# Patient Record
Sex: FEMALE | Race: WHITE | NOT HISPANIC OR LATINO
[De-identification: names, ages, dates, MRNs, and addresses within clinical notes are randomized per-mention and may not be internally consistent; named-entity substitution may affect disease eponyms.]

---

## 2017-10-06 ENCOUNTER — RX RENEWAL (OUTPATIENT)
Age: 72
End: 2017-10-06

## 2017-11-11 DIAGNOSIS — Z92.89 PERSONAL HISTORY OF OTHER MEDICAL TREATMENT: ICD-10-CM

## 2017-11-11 DIAGNOSIS — M15.9 POLYOSTEOARTHRITIS, UNSPECIFIED: ICD-10-CM

## 2017-11-11 DIAGNOSIS — F15.90 OTHER STIMULANT USE, UNSPECIFIED, UNCOMPLICATED: ICD-10-CM

## 2017-11-11 DIAGNOSIS — Z82.49 FAMILY HISTORY OF ISCHEMIC HEART DISEASE AND OTHER DISEASES OF THE CIRCULATORY SYSTEM: ICD-10-CM

## 2017-11-11 DIAGNOSIS — K58.9 IRRITABLE BOWEL SYNDROME W/OUT DIARRHEA: ICD-10-CM

## 2017-11-13 ENCOUNTER — APPOINTMENT (OUTPATIENT)
Dept: FAMILY MEDICINE | Facility: CLINIC | Age: 72
End: 2017-11-13
Payer: MEDICARE

## 2017-11-13 VITALS
SYSTOLIC BLOOD PRESSURE: 138 MMHG | HEIGHT: 65 IN | HEART RATE: 70 BPM | OXYGEN SATURATION: 98 % | RESPIRATION RATE: 16 BRPM | DIASTOLIC BLOOD PRESSURE: 78 MMHG | BODY MASS INDEX: 21.49 KG/M2 | WEIGHT: 129 LBS

## 2017-11-13 PROCEDURE — 90662 IIV NO PRSV INCREASED AG IM: CPT

## 2017-11-13 PROCEDURE — G0008: CPT

## 2017-11-13 PROCEDURE — 99213 OFFICE O/P EST LOW 20 MIN: CPT | Mod: 25

## 2017-11-13 RX ORDER — ASCORBIC ACID/BIOFLAVONOIDS 500-200 MG
TABLET ORAL
Refills: 0 | Status: ACTIVE | COMMUNITY

## 2017-11-13 RX ORDER — PSYLLIUM HUSK 0.4 G
CAPSULE ORAL
Refills: 0 | Status: ACTIVE | COMMUNITY

## 2018-01-17 ENCOUNTER — RX RENEWAL (OUTPATIENT)
Age: 73
End: 2018-01-17

## 2018-02-26 ENCOUNTER — RX RENEWAL (OUTPATIENT)
Age: 73
End: 2018-02-26

## 2018-04-18 ENCOUNTER — TRANSCRIPTION ENCOUNTER (OUTPATIENT)
Age: 73
End: 2018-04-18

## 2018-04-18 ENCOUNTER — APPOINTMENT (OUTPATIENT)
Dept: FAMILY MEDICINE | Facility: CLINIC | Age: 73
End: 2018-04-18
Payer: MEDICARE

## 2018-04-18 VITALS
SYSTOLIC BLOOD PRESSURE: 132 MMHG | WEIGHT: 137 LBS | HEART RATE: 73 BPM | HEIGHT: 64 IN | OXYGEN SATURATION: 96 % | BODY MASS INDEX: 23.39 KG/M2 | DIASTOLIC BLOOD PRESSURE: 70 MMHG

## 2018-04-18 DIAGNOSIS — K21.9 GASTRO-ESOPHAGEAL REFLUX DISEASE W/OUT ESOPHAGITIS: ICD-10-CM

## 2018-04-18 PROCEDURE — 99213 OFFICE O/P EST LOW 20 MIN: CPT

## 2018-04-18 RX ORDER — HYDROCODONE BITARTRATE AND ACETAMINOPHEN 5; 325 MG/1; MG/1
5-325 TABLET ORAL
Refills: 0 | Status: DISCONTINUED | COMMUNITY
End: 2018-04-18

## 2018-04-18 RX ORDER — BIFIDOBACTERIUM LONGUM 10MM CELL
CAPSULE ORAL
Refills: 0 | Status: DISCONTINUED | COMMUNITY
End: 2018-04-18

## 2018-04-18 RX ORDER — CARISOPRODOL 350 MG/1
350 TABLET ORAL
Refills: 0 | Status: DISCONTINUED | COMMUNITY
End: 2018-04-18

## 2018-04-18 RX ORDER — IRON/IRON ASP GLY/FA/MV-MIN 38 125-25-1MG
TABLET ORAL EVERY OTHER DAY
Refills: 0 | Status: ACTIVE | COMMUNITY

## 2018-05-16 ENCOUNTER — CLINICAL ADVICE (OUTPATIENT)
Age: 73
End: 2018-05-16

## 2018-05-16 DIAGNOSIS — L65.9 NONSCARRING HAIR LOSS, UNSPECIFIED: ICD-10-CM

## 2018-07-12 ENCOUNTER — APPOINTMENT (OUTPATIENT)
Dept: FAMILY MEDICINE | Facility: CLINIC | Age: 73
End: 2018-07-12
Payer: MEDICARE

## 2018-07-12 VITALS
BODY MASS INDEX: 23.52 KG/M2 | WEIGHT: 137 LBS | RESPIRATION RATE: 14 BRPM | HEART RATE: 74 BPM | SYSTOLIC BLOOD PRESSURE: 124 MMHG | DIASTOLIC BLOOD PRESSURE: 80 MMHG | OXYGEN SATURATION: 98 %

## 2018-07-12 DIAGNOSIS — Z87.898 PERSONAL HISTORY OF OTHER SPECIFIED CONDITIONS: ICD-10-CM

## 2018-07-12 DIAGNOSIS — D51.3 OTHER DIETARY VITAMIN B12 DEFICIENCY ANEMIA: ICD-10-CM

## 2018-07-12 PROCEDURE — 99214 OFFICE O/P EST MOD 30 MIN: CPT

## 2018-07-12 RX ORDER — METOPROLOL SUCCINATE 25 MG/1
25 TABLET, EXTENDED RELEASE ORAL
Qty: 90 | Refills: 0 | Status: ACTIVE | COMMUNITY
Start: 2017-05-17

## 2018-07-12 RX ORDER — VITAMIN B COMPLEX/FOLIC ACID 0.4 MG
TABLET ORAL
Refills: 0 | Status: ACTIVE | COMMUNITY

## 2018-07-12 NOTE — PHYSICAL EXAM
[No Acute Distress] : no acute distress [Well Nourished] : well nourished [Well Developed] : well developed [Well-Appearing] : well-appearing [Normal Voice/Communication] : normal voice/communication [No Respiratory Distress] : no respiratory distress  [Clear to Auscultation] : lungs were clear to auscultation bilaterally [No Accessory Muscle Use] : no accessory muscle use [Normal Rate] : normal rate  [Regular Rhythm] : with a regular rhythm [Normal S1, S2] : normal S1 and S2 [No Murmur] : no murmur heard [No Edema] : there was no peripheral edema [Speech Grossly Normal] : speech grossly normal [Memory Grossly Normal] : memory grossly normal [Normal Affect] : the affect was normal [Normal Mood] : the mood was normal [Normal Insight/Judgement] : insight and judgment were intact [de-identified] : no tenderness on palpation of lumbar area, decreased ROM noted [de-identified] : gait is slow and stiff

## 2018-07-12 NOTE — HISTORY OF PRESENT ILLNESS
[FreeTextEntry1] : pt presents for med check and b/w rx  [de-identified] : Her back pain is the same.  She is unable to take any NSAIDs due to gastritis.  She already tried tylenol without improvement.  She now has to do her own landscaping.  When she doesn't take the hydrocodone her pain could be at a 9 and with her medication is can be a zero.  The hydrocodone allows her to stay active.  She takes the medication on average 5-7 days per week.  She hasn't been needing the carisoprodol as the muscle cramps resolved.  She denies any side effects from the medication.  She takes the ranitidine daily but still gets heartburn.  Her GI doctor just ordered nexium for her to take.

## 2018-07-12 NOTE — ASSESSMENT
[FreeTextEntry1] : she signed the controlled substances agreement, the opioid risk assessment tool was performed, she submitted a urine for toxicology, we watched the [facility] opioid video together, I renewed her hydrocodone and checked I-STOP, ref #[id number], she will continue to only take it when needed, labs ordered, she will follow up in 3 months or sooner prn, her hypertension and hyperlipidemia are stable

## 2018-07-12 NOTE — REVIEW OF SYSTEMS
[Abdominal Pain] : abdominal pain [Heartburn] : heartburn [Joint Pain] : joint pain [Joint Stiffness] : joint stiffness [Muscle Pain] : muscle pain [Back Pain] : back pain [Negative] : Heme/Lymph

## 2018-08-10 ENCOUNTER — RESULT REVIEW (OUTPATIENT)
Age: 73
End: 2018-08-10

## 2018-11-28 ENCOUNTER — APPOINTMENT (OUTPATIENT)
Dept: FAMILY MEDICINE | Facility: CLINIC | Age: 73
End: 2018-11-28
Payer: MEDICARE

## 2018-11-28 VITALS
SYSTOLIC BLOOD PRESSURE: 130 MMHG | TEMPERATURE: 98.7 F | RESPIRATION RATE: 17 BRPM | OXYGEN SATURATION: 98 % | HEART RATE: 73 BPM | DIASTOLIC BLOOD PRESSURE: 80 MMHG

## 2018-11-28 VITALS — DIASTOLIC BLOOD PRESSURE: 78 MMHG | SYSTOLIC BLOOD PRESSURE: 130 MMHG

## 2018-11-28 DIAGNOSIS — R68.89 OTHER GENERAL SYMPTOMS AND SIGNS: ICD-10-CM

## 2018-11-28 DIAGNOSIS — R63.5 ABNORMAL WEIGHT GAIN: ICD-10-CM

## 2018-11-28 PROCEDURE — 99213 OFFICE O/P EST LOW 20 MIN: CPT | Mod: 25

## 2018-11-28 PROCEDURE — G0008: CPT

## 2018-11-28 PROCEDURE — 90662 IIV NO PRSV INCREASED AG IM: CPT

## 2018-11-28 NOTE — ASSESSMENT
[FreeTextEntry1] : high-dose flu vaccine was given, labs were reviewed, no medication changes were made, I renewed hydrocodone and checked I-STOP, ref #[id number], she will follow up in 4 months or sooner if needed, she was given emotional support

## 2018-11-28 NOTE — PHYSICAL EXAM
[No Acute Distress] : no acute distress [Well Nourished] : well nourished [Well Developed] : well developed [Well-Appearing] : well-appearing [Normal Voice/Communication] : normal voice/communication [No Respiratory Distress] : no respiratory distress  [Clear to Auscultation] : lungs were clear to auscultation bilaterally [No Accessory Muscle Use] : no accessory muscle use [Normal Rate] : normal rate  [Regular Rhythm] : with a regular rhythm [Normal S1, S2] : normal S1 and S2 [No Carotid Bruits] : no carotid bruits [No Edema] : there was no peripheral edema [Speech Grossly Normal] : speech grossly normal [Memory Grossly Normal] : memory grossly normal [Normal Affect] : the affect was normal [Normal Mood] : the mood was normal [Normal Insight/Judgement] : insight and judgment were intact [de-identified] : decreased ROM of lumbar spine [de-identified] : gait is slow and stiff

## 2018-11-28 NOTE — HISTORY OF PRESENT ILLNESS
[FreeTextEntry1] : Patient present for med check, flu shot \par  [de-identified] : She is sad since she had to put her 18 year old dog to sleep.  She has been sitting more recently and it is causing more back discomfort.  The hydrocodone is helping to alleviate the pain.  She only takes it when really necessary and denies any side effects.  It brings her pain level from severe to tolerable.  She has lost about 7 pounds and attributes it to being more physically active earlier in the fall raking leaves.

## 2018-11-28 NOTE — REVIEW OF SYSTEMS
[Recent Change In Weight] : ~T recent weight change [Joint Pain] : joint pain [Joint Stiffness] : joint stiffness [Muscle Pain] : muscle pain [Back Pain] : back pain [Negative] : Heme/Lymph

## 2018-11-30 DIAGNOSIS — Z92.29 PERSONAL HISTORY OF OTHER DRUG THERAPY: ICD-10-CM

## 2019-03-26 ENCOUNTER — RX RENEWAL (OUTPATIENT)
Age: 74
End: 2019-03-26

## 2019-03-26 ENCOUNTER — TRANSCRIPTION ENCOUNTER (OUTPATIENT)
Age: 74
End: 2019-03-26

## 2019-04-29 ENCOUNTER — APPOINTMENT (OUTPATIENT)
Dept: FAMILY MEDICINE | Facility: CLINIC | Age: 74
End: 2019-04-29
Payer: MEDICARE

## 2019-04-29 VITALS
OXYGEN SATURATION: 96 % | WEIGHT: 129 LBS | BODY MASS INDEX: 22.14 KG/M2 | RESPIRATION RATE: 12 BRPM | DIASTOLIC BLOOD PRESSURE: 70 MMHG | SYSTOLIC BLOOD PRESSURE: 120 MMHG | HEART RATE: 64 BPM

## 2019-04-29 PROCEDURE — 99213 OFFICE O/P EST LOW 20 MIN: CPT

## 2019-04-29 RX ORDER — CARISOPRODOL 350 MG/1
350 TABLET ORAL
Qty: 90 | Refills: 0 | Status: DISCONTINUED | COMMUNITY
Start: 2017-11-13 | End: 2019-04-29

## 2019-04-29 RX ORDER — HYDROCODONE BITARTRATE AND ACETAMINOPHEN 5; 325 MG/1; MG/1
5-325 TABLET ORAL
Qty: 180 | Refills: 0 | Status: DISCONTINUED | COMMUNITY
Start: 2017-11-13 | End: 2019-04-29

## 2019-04-29 RX ORDER — CHLORDIAZEPOXIDE HYDROCHLORIDE AND CLIDINIUM BROMIDE 5; 2.5 MG/1; MG/1
5-2.5 CAPSULE ORAL
Qty: 120 | Refills: 0 | Status: DISCONTINUED | COMMUNITY
Start: 2018-07-03 | End: 2019-04-29

## 2019-04-29 RX ORDER — RANITIDINE 150 MG/1
150 TABLET ORAL
Qty: 180 | Refills: 3 | Status: DISCONTINUED | COMMUNITY
Start: 2018-04-18 | End: 2019-04-29

## 2019-04-29 RX ORDER — ESOMEPRAZOLE MAGNESIUM 40 MG/1
40 CAPSULE, DELAYED RELEASE ORAL
Qty: 90 | Refills: 0 | Status: DISCONTINUED | COMMUNITY
Start: 2018-07-03 | End: 2019-04-29

## 2019-04-29 NOTE — HISTORY OF PRESENT ILLNESS
[FreeTextEntry1] : pt presents for 6 month check  [de-identified] : She has had pain in the left knee for the past month that wakes her from sleep.  She denies any injury but she has been walking more as she has a new puppy.  The pain in her back is temporarily better after walking but she gets very stiff and sore.  She takes the hydrocodone when the pain level gets to an 8 out of 10 and it brings it down to a 0-2.  She is tolerating the medication well and denies any side effects.

## 2019-04-29 NOTE — REVIEW OF SYSTEMS
[Heartburn] : heartburn [Back Pain] : back pain [Negative] : Heme/Lymph [FreeTextEntry7] : occasional heartburn [FreeTextEntry9] : left knee pain

## 2019-04-29 NOTE — PLAN
[FreeTextEntry1] : I ordered an xray of the left knee and labs, I renewed hydrocodone and checked I-STOP, ref #[id number], she will continue to take it only for severe pain and will follow up for a well visit in the fall or sooner prn

## 2019-04-29 NOTE — PHYSICAL EXAM
[No Acute Distress] : no acute distress [Well Nourished] : well nourished [Well Developed] : well developed [Well-Appearing] : well-appearing [Normal Voice/Communication] : normal voice/communication [Speech Grossly Normal] : speech grossly normal [Memory Grossly Normal] : memory grossly normal [Normal Affect] : the affect was normal [Normal Mood] : the mood was normal [Normal Insight/Judgement] : insight and judgment were intact [de-identified] : no tenderness on palpation of lumbar area, decreased flexion and extension [de-identified] : left knee without swelling, slight crepitus with flexion and extension

## 2019-05-02 ENCOUNTER — APPOINTMENT (OUTPATIENT)
Dept: RADIOLOGY | Facility: CLINIC | Age: 74
End: 2019-05-02
Payer: MEDICARE

## 2019-05-02 PROCEDURE — 73562 X-RAY EXAM OF KNEE 3: CPT | Mod: LT

## 2019-05-06 ENCOUNTER — RX RENEWAL (OUTPATIENT)
Age: 74
End: 2019-05-06

## 2019-08-13 ENCOUNTER — RESULT REVIEW (OUTPATIENT)
Age: 74
End: 2019-08-13

## 2019-10-21 ENCOUNTER — APPOINTMENT (OUTPATIENT)
Dept: FAMILY MEDICINE | Facility: CLINIC | Age: 74
End: 2019-10-21
Payer: MEDICARE

## 2019-10-21 VITALS
OXYGEN SATURATION: 97 % | SYSTOLIC BLOOD PRESSURE: 134 MMHG | HEART RATE: 74 BPM | RESPIRATION RATE: 16 BRPM | DIASTOLIC BLOOD PRESSURE: 76 MMHG

## 2019-10-21 DIAGNOSIS — M25.562 PAIN IN LEFT KNEE: ICD-10-CM

## 2019-10-21 DIAGNOSIS — D50.8 OTHER IRON DEFICIENCY ANEMIAS: ICD-10-CM

## 2019-10-21 DIAGNOSIS — M19.90 UNSPECIFIED OSTEOARTHRITIS, UNSPECIFIED SITE: ICD-10-CM

## 2019-10-21 PROCEDURE — 99213 OFFICE O/P EST LOW 20 MIN: CPT | Mod: 25

## 2019-10-21 PROCEDURE — 90653 IIV ADJUVANT VACCINE IM: CPT

## 2019-10-21 PROCEDURE — G0008: CPT

## 2019-10-21 RX ORDER — DICLOFENAC SODIUM 10 MG/G
1 GEL TOPICAL
Qty: 300 | Refills: 3 | Status: COMPLETED | COMMUNITY
Start: 2017-10-06 | End: 2019-10-21

## 2019-11-11 ENCOUNTER — RX RENEWAL (OUTPATIENT)
Age: 74
End: 2019-11-11

## 2019-11-11 ENCOUNTER — MEDICATION RENEWAL (OUTPATIENT)
Age: 74
End: 2019-11-11

## 2019-11-29 ENCOUNTER — RESULT REVIEW (OUTPATIENT)
Age: 74
End: 2019-11-29

## 2020-02-04 ENCOUNTER — APPOINTMENT (OUTPATIENT)
Dept: FAMILY MEDICINE | Facility: CLINIC | Age: 75
End: 2020-02-04
Payer: MEDICARE

## 2020-02-04 VITALS
OXYGEN SATURATION: 97 % | SYSTOLIC BLOOD PRESSURE: 120 MMHG | HEIGHT: 64 IN | WEIGHT: 128 LBS | DIASTOLIC BLOOD PRESSURE: 70 MMHG | RESPIRATION RATE: 12 BRPM | BODY MASS INDEX: 21.85 KG/M2 | TEMPERATURE: 99.4 F | HEART RATE: 76 BPM

## 2020-02-04 PROCEDURE — 99213 OFFICE O/P EST LOW 20 MIN: CPT

## 2020-02-04 NOTE — REVIEW OF SYSTEMS
[Fever] : no fever [Chills] : chills [Fatigue] : fatigue [Discharge] : no discharge [Earache] : earache [Nasal Discharge] : nasal discharge [Sore Throat] : no sore throat [Postnasal Drip] : no postnasal drip [Shortness Of Breath] : no shortness of breath [Wheezing] : no wheezing [Cough] : no cough [Headache] : headache [Negative] : Heme/Lymph

## 2020-02-04 NOTE — PHYSICAL EXAM
[No Acute Distress] : no acute distress [Well Nourished] : well nourished [Well-Appearing] : well-appearing [Well Developed] : well developed [Normal Sclera/Conjunctiva] : normal sclera/conjunctiva [Normal Voice/Communication] : normal voice/communication [Normal Oropharynx] : the oropharynx was normal [Normal Outer Ear/Nose] : the outer ears and nose were normal in appearance [Normal TMs] : both tympanic membranes were normal [No Lymphadenopathy] : no lymphadenopathy [No Respiratory Distress] : no respiratory distress  [Supple] : supple [Normal Rate] : normal rate  [No Accessory Muscle Use] : no accessory muscle use [Clear to Auscultation] : lungs were clear to auscultation bilaterally [Regular Rhythm] : with a regular rhythm [Normal S1, S2] : normal S1 and S2 [Coordination Grossly Intact] : coordination grossly intact [Speech Grossly Normal] : speech grossly normal [Memory Grossly Normal] : memory grossly normal [Normal Mood] : the mood was normal [Normal Affect] : the affect was normal [de-identified] : mils swelling in nasal passages [Normal Insight/Judgement] : insight and judgment were intact

## 2020-02-04 NOTE — HISTORY OF PRESENT ILLNESS
[FreeTextEntry8] : pt c/o ears cracking +rn/pnd -cough -st x 3 week \par pt c/o rib area discomfort x on and off 1 week.  She started with a runny nose about 3 weeks ago.  She is using zicam nasal spray.  She has had some mucous dripping down her throat. She has had a headache and feels fatigued and she has pain in her right ear.  She had discomfort on the sides where her ribs are but that has improved.  She isn't coughing.  She has a bunion on her foot and hasn't been walking.  She is taking tylenol for the headaches.

## 2020-02-04 NOTE — PLAN
[FreeTextEntry1] : she was advised to stop the zicam nasal spray and she will use the fluticasone nasal spray and will follow up for any worsening sinus pain or pressure or change in color in mucous

## 2020-04-20 ENCOUNTER — APPOINTMENT (OUTPATIENT)
Dept: FAMILY MEDICINE | Facility: CLINIC | Age: 75
End: 2020-04-20
Payer: MEDICARE

## 2020-04-20 DIAGNOSIS — J06.9 ACUTE UPPER RESPIRATORY INFECTION, UNSPECIFIED: ICD-10-CM

## 2020-04-20 PROCEDURE — 99213 OFFICE O/P EST LOW 20 MIN: CPT | Mod: 95

## 2020-04-20 NOTE — PLAN
[FreeTextEntry1] : she will be mailed a lab rx, she declined having the Hepatitis C bloodwork done, she will call for a medication renewal when needed and follow up in 3 months or sooner prn

## 2020-04-20 NOTE — REVIEW OF SYSTEMS
[Abdominal Pain] : no abdominal pain [Constipation] : constipation [Nausea] : no nausea [Vomiting] : no vomiting [Joint Stiffness] : joint stiffness [Joint Pain] : joint pain [Muscle Pain] : muscle pain [Back Pain] : back pain [Negative] : Heme/Lymph

## 2020-04-20 NOTE — HISTORY OF PRESENT ILLNESS
[Persistent pain despite utilization of non-opioid alternative(s)] : Persistent pain despite utilization of non-opioid alternative(s) [< = 3 Low Risk] : Opioid Risk Tool: < = 3 Low Risk [Opioids for pain that has lasted more than 3 months, or past time of normal tissue healing (> 3 months)] : Opioids for pain that has lasted more than 3 months, or past time of normal tissue healing (> 3 months) [Medical Office: ([facility])___] : at the medical office located in  [Home] : at home, [unfilled] , at the time of the visit. [Self] : self [Patient] : the patient [9] : 1. What number best describes your pain on average in the past week? 9/10 pain [8] : 2. What number best describes how, during the past week, pain has interfered with your enjoyment of life? 8/10 pain [7] : 3. What number best describes how, during the past week, pain has interfered with your general activity? 7/10 pain [I-Stop completed at today's visit] : I-Stop completed at today's visit [] : Sedation: None [<50 MME/day (low risk)] : <50 MME/day (low risk) [A discussion was had and/or printed educational materials were provided to the patient inclusive of safe treatment and optimization.] : A discussion was had and/or printed educational materials were provided to the patient inclusive of safe treatment and optimization. The treatment may include non-pharmacologic modalities or a combination of approaches.  We discussed risks, benefits, and alternatives associated with treatment [Last controlled substance contract reviewed and signed with patient:] : Last controlled substance contract reviewed and signed with patient [FreeTextEntry3] : 10 [FreeTextEntry2] : 24 [de-identified] : 4/20/2020 [FreeTextEntry6] : she will be maintained on the low dose of hydrocodone, she is receiving relief and when the weather improves and the COVID 19 pandemic resolves and she is more active she will try to reduce the dose [FreeTextEntry4] : 7/12/18 [de-identified] : After receiving verbal consent the visit was performed virtually via [employer] as the patient was unable to be seen in the office due to the COVID 19 pandemic.  She is taking the hydrocodone and getting relief of the pain.  The pain has worsened due to the weather and she has been sitting more as she is staying home due to the pandemic.\par  [FreeTextEntry1] : Pt presents for a follow up on her medication.

## 2020-04-20 NOTE — PHYSICAL EXAM
[Well Nourished] : well nourished [Well Developed] : well developed [No Acute Distress] : no acute distress [Normal Voice/Communication] : normal voice/communication [Normal Sclera/Conjunctiva] : normal sclera/conjunctiva [Well-Appearing] : well-appearing [Speech Grossly Normal] : speech grossly normal [Memory Grossly Normal] : memory grossly normal [Normal Affect] : the affect was normal [Coordination Grossly Intact] : coordination grossly intact [Normal Insight/Judgement] : insight and judgment were intact [Normal Mood] : the mood was normal [de-identified] : decreased flexion and extension of lumbar spine

## 2020-06-10 ENCOUNTER — APPOINTMENT (OUTPATIENT)
Dept: FAMILY MEDICINE | Facility: CLINIC | Age: 75
End: 2020-06-10
Payer: MEDICARE

## 2020-06-10 DIAGNOSIS — Z12.39 ENCOUNTER FOR OTHER SCREENING FOR MALIGNANT NEOPLASM OF BREAST: ICD-10-CM

## 2020-06-10 PROCEDURE — 99213 OFFICE O/P EST LOW 20 MIN: CPT | Mod: 95

## 2020-06-10 NOTE — REVIEW OF SYSTEMS
[Joint Pain] : joint pain [Joint Stiffness] : joint stiffness [Back Pain] : back pain [Negative] : Heme/Lymph [Constipation] : no constipation

## 2020-06-10 NOTE — PHYSICAL EXAM
[No Acute Distress] : no acute distress [Well Nourished] : well nourished [Well Developed] : well developed [Well-Appearing] : well-appearing [Normal Voice/Communication] : normal voice/communication [No Respiratory Distress] : no respiratory distress  [No Accessory Muscle Use] : no accessory muscle use [Speech Grossly Normal] : speech grossly normal [Memory Grossly Normal] : memory grossly normal [Normal Affect] : the affect was normal [Normal Mood] : the mood was normal [Normal Insight/Judgement] : insight and judgment were intact

## 2020-06-10 NOTE — HISTORY OF PRESENT ILLNESS
[Persistent pain despite utilization of non-opioid alternative(s)] : Persistent pain despite utilization of non-opioid alternative(s) [Opioids for pain that has lasted more than 3 months, or past time of normal tissue healing (> 3 months)] : Opioids for pain that has lasted more than 3 months, or past time of normal tissue healing (> 3 months) [7] : 2. What number best describes how, during the past week, pain has interfered with your enjoyment of life? 7/10 pain [10 (completely interferes)] : 3. What number best describes how, during the past week, pain has interfered with your general activity? 10/10 pain [< = 3 Low Risk] : Opioid Risk Tool: < = 3 Low Risk [] : Sedation: None [<50 MME/day (low risk)] : <50 MME/day (low risk) [A discussion was had and/or printed educational materials were provided to the patient inclusive of safe treatment and optimization.] : A discussion was had and/or printed educational materials were provided to the patient inclusive of safe treatment and optimization. The treatment may include non-pharmacologic modalities or a combination of approaches.  We discussed risks, benefits, and alternatives associated with treatment [Home] : at home, [unfilled] , at the time of the visit. [Medical Office: ([facility])___] : at the medical office located in  [Verbal consent obtained from patient] : the patient, [unfilled] [FreeTextEntry2] : 24 [FreeTextEntry3] : 10 [de-identified] : 6/9/2020 [FreeTextEntry4] : 7/12/2018 [FreeTextEntry6] : She will continue low dose of hydrocodone for her ongoing back pain, she declines seeing pain management for injections and isn't a candidate for NSAIDs due to hypertension [FreeTextEntry1] : Pt presents for a follow up for her ongoing back pain [de-identified] : After receiving verbal consent the visit was performed virtually via [employer] as the patient was unable to be seen in the office due to the COVID 19 pandemic.  She has been able to be more active as the weather improved but is still in pain on a daily basis with her back.  She takes up to 2 hydrocodone per day but not every day.  She doesn't want to see a pain management provider for injections.\par

## 2020-06-10 NOTE — PLAN
[FreeTextEntry1] : after checking I-STOP I renewed the hydrocodone, she will continue to take it only for severe pain, she will be mailed a mammogram rx and will go for lab testing as previously ordered, she was advised to follow up in the fall for a wellness visit and flu vaccine

## 2020-08-26 ENCOUNTER — RX RENEWAL (OUTPATIENT)
Age: 75
End: 2020-08-26

## 2020-10-12 ENCOUNTER — APPOINTMENT (OUTPATIENT)
Dept: FAMILY MEDICINE | Facility: CLINIC | Age: 75
End: 2020-10-12
Payer: MEDICARE

## 2020-10-12 VITALS
RESPIRATION RATE: 14 BRPM | HEIGHT: 64 IN | SYSTOLIC BLOOD PRESSURE: 130 MMHG | WEIGHT: 133 LBS | TEMPERATURE: 98 F | BODY MASS INDEX: 22.71 KG/M2 | OXYGEN SATURATION: 98 % | HEART RATE: 78 BPM | DIASTOLIC BLOOD PRESSURE: 70 MMHG

## 2020-10-12 PROCEDURE — G0438: CPT

## 2020-10-12 PROCEDURE — G0008: CPT

## 2020-10-12 PROCEDURE — 90662 IIV NO PRSV INCREASED AG IM: CPT

## 2020-10-12 PROCEDURE — 99212 OFFICE O/P EST SF 10 MIN: CPT | Mod: 25

## 2020-10-12 RX ORDER — EFINACONAZOLE 100 MG/ML
10 SOLUTION TOPICAL
Qty: 8 | Refills: 0 | Status: ACTIVE | COMMUNITY
Start: 2020-08-06

## 2020-10-12 RX ORDER — PENICILLIN V POTASSIUM 500 MG/1
500 TABLET, FILM COATED ORAL
Qty: 40 | Refills: 0 | Status: DISCONTINUED | COMMUNITY
Start: 2020-07-21 | End: 2020-10-12

## 2020-10-12 NOTE — PHYSICAL EXAM
[No Acute Distress] : no acute distress [Well Nourished] : well nourished [Well Developed] : well developed [Well-Appearing] : well-appearing [Normal Voice/Communication] : normal voice/communication [Normal Sclera/Conjunctiva] : normal sclera/conjunctiva [Supple] : supple [No Respiratory Distress] : no respiratory distress  [No Accessory Muscle Use] : no accessory muscle use [Clear to Auscultation] : lungs were clear to auscultation bilaterally [Normal Rate] : normal rate  [Regular Rhythm] : with a regular rhythm [Normal S1, S2] : normal S1 and S2 [No Carotid Bruits] : no carotid bruits [No Edema] : there was no peripheral edema [Soft] : abdomen soft [Non Tender] : non-tender [Non-distended] : non-distended [No HSM] : no HSM [Normal Bowel Sounds] : normal bowel sounds [Coordination Grossly Intact] : coordination grossly intact [No Focal Deficits] : no focal deficits [Speech Grossly Normal] : speech grossly normal [Memory Grossly Normal] : memory grossly normal [Normal Affect] : the affect was normal [Normal Mood] : the mood was normal [Normal Insight/Judgement] : insight and judgment were intact [de-identified] : TM's dull, some wax in canals, no occlusion [de-identified] : decreased flexion and extension of lumbar spine

## 2020-10-12 NOTE — PLAN
[FreeTextEntry1] : high dose flu vaccine was given, I-STOP was checked, hydrocodone was renewed, she will continue to take it appropriately, she will follow up in 3 months and will check labs prior

## 2020-10-12 NOTE — HISTORY OF PRESENT ILLNESS
[Persistent pain despite utilization of non-opioid alternative(s)] : Persistent pain despite utilization of non-opioid alternative(s) [Opioids for pain that has lasted more than 3 months, or past time of normal tissue healing (> 3 months)] : Opioids for pain that has lasted more than 3 months, or past time of normal tissue healing (> 3 months) [7] : 1. What number best describes your pain on average in the past week? 7/10 pain [5] : 2. What number best describes how, during the past week, pain has interfered with your enjoyment of life? 5/10 pain [3] : 3. What number best describes how, during the past week, pain has interfered with your general activity? 3/10 pain [< = 3 Low Risk] : Opioid Risk Tool: < = 3 Low Risk [<50 MME/day (low risk)] : <50 MME/day (low risk) [I-Stop completed at today's visit] : I-Stop completed at today's visit [Last controlled substance contract reviewed and signed with patient:] : Last controlled substance contract reviewed and signed with patient [] : Sedation: None [A discussion was had and/or printed educational materials were provided to the patient inclusive of safe treatment and optimization.] : A discussion was had and/or printed educational materials were provided to the patient inclusive of safe treatment and optimization. The treatment may include non-pharmacologic modalities or a combination of approaches.  We discussed risks, benefits, and alternatives associated with treatment [FreeTextEntry2] : 15 [FreeTextEntry3] : 10 [de-identified] : 10/12/2020 [FreeTextEntry4] : 10/12/2020 [FreeTextEntry6] : she will continue the low dose of hydrocodone as needed [de-identified] : has a clogged feeling in her ears.  She wants to know what her blood type is

## 2020-10-12 NOTE — HEALTH RISK ASSESSMENT
[Very Good] : ~his/her~  mood as very good [Never (0 pts)] : Never (0 points) [No] : In the past 12 months have you used drugs other than those required for medical reasons? No [No falls in past year] : Patient reported no falls in the past year [HIV test declined] : HIV test declined [Hepatitis C test declined] : Hepatitis C test declined [None] : None [Retired] : retired [Single] : single [Feels Safe at Home] : Feels safe at home [Fully functional (bathing, dressing, toileting, transferring, walking, feeding)] : Fully functional (bathing, dressing, toileting, transferring, walking, feeding) [Fully functional (using the telephone, shopping, preparing meals, housekeeping, doing laundry, using] : Fully functional and needs no help or supervision to perform IADLs (using the telephone, shopping, preparing meals, housekeeping, doing laundry, using transportation, managing medications and managing finances) [Reports changes in hearing] : Reports changes in hearing [Reports changes in dental health] : Reports changes in dental health [Smoke Detector] : smoke detector [Carbon Monoxide Detector] : carbon monoxide detector [Safety elements used in home] : safety elements used in home [Seat Belt] :  uses seat belt [Sunscreen] : uses sunscreen [Patient/Caregiver not ready to engage] : Patient/Caregiver not ready to engage [FreeTextEntry1] : see HPI [] : No [de-identified] : none [de-identified] : Dr Muller (Cardio), Dr Mace (Podiatry), Dr Humphries (Ophth) [de-identified] : walks for exercise [de-identified] : healthy [Change in mental status noted] : No change in mental status noted [Language] : denies difficulty with language [Behavior] : denies difficulty with behavior [Learning/Retaining New Information] : denies difficulty learning/retaining new information [Handling Complex Tasks] : denies difficulty handling complex tasks [Reasoning] : denies difficulty with reasoning [Spatial Ability and Orientation] : denies difficulty with spatial ability and orientation [Reports changes in vision] : Reports no changes in vision [Reports normal functional visual acuity (ie: able to read med bottle)] : Reports poor functional visual acuity.  [Guns at Home] : no guns at home [Travel to Developing Areas] : does not  travel to developing areas [TB Exposure] : is not being exposed to tuberculosis [Caregiver Concerns] : does not have caregiver concerns [ColonoscopyDate] : 11/13 [de-identified] : has a roommate [de-identified] : clogged feeling in ears [de-identified] : had a tooth pulled [AdvancecareDate] : 10/20

## 2020-10-12 NOTE — REVIEW OF SYSTEMS
[Joint Pain] : joint pain [Back Pain] : back pain [Negative] : Heme/Lymph [FreeTextEntry4] : clogged ears

## 2020-12-09 ENCOUNTER — APPOINTMENT (OUTPATIENT)
Dept: FAMILY MEDICINE | Facility: CLINIC | Age: 75
End: 2020-12-09

## 2020-12-20 ENCOUNTER — RX RENEWAL (OUTPATIENT)
Age: 75
End: 2020-12-20

## 2021-01-13 ENCOUNTER — APPOINTMENT (OUTPATIENT)
Dept: FAMILY MEDICINE | Facility: CLINIC | Age: 76
End: 2021-01-13
Payer: MEDICARE

## 2021-01-13 PROCEDURE — 99441: CPT | Mod: 95

## 2021-01-14 NOTE — HISTORY OF PRESENT ILLNESS
[10 (completely interferes)] : 2. What number best describes how, during the past week, pain has interfered with your enjoyment of life? 10/10 pain [< = 3 Low Risk] : Opioid Risk Tool: < = 3 Low Risk [Persistent pain despite utilization of non-opioid alternative(s)] : Persistent pain despite utilization of non-opioid alternative(s) [Opioids for pain that has lasted more than 3 months, or past time of normal tissue healing (> 3 months)] : Opioids for pain that has lasted more than 3 months, or past time of normal tissue healing (> 3 months) [8] : 1. What number best describes your pain on average in the past week? 8/10 pain [<50 MME/day (low risk)] : <50 MME/day (low risk) [I-Stop completed at today's visit] : I-Stop completed at today's visit [Last controlled substance contract reviewed and signed with patient:] : Last controlled substance contract reviewed and signed with patient [] : Sedation: None [A discussion was had and/or printed educational materials were provided to the patient inclusive of safe treatment and optimization.] : A discussion was had and/or printed educational materials were provided to the patient inclusive of safe treatment and optimization. The treatment may include non-pharmacologic modalities or a combination of approaches.  We discussed risks, benefits, and alternatives associated with treatment [Home] : at home, [unfilled] , at the time of the visit. [Medical Office: ([facility])___] : at the medical office located in  [Verbal consent obtained from patient] : the patient, [unfilled] [FreeTextEntry2] : 26 [FreeTextEntry3] : 10 [de-identified] : 1/13/2021 [FreeTextEntry4] : 10/12/2020 [FreeTextEntry6] : she will continue the low dose of hydrocodone, she is getting good pain relief and not experiencing any side effects [FreeTextEntry1] : Pt presents for a medication renewal for her back pain [de-identified] : The patient telephoned and requested a call back to discuss their health.  The visit was performed over the telephone as the patient was unable to be seen in the office due to the COVID 19 pandemic.\par

## 2021-01-14 NOTE — PLAN
[FreeTextEntry1] : I-STOP was checked and hydrocodone renewed, she will continue to take it appropriately and will follow up in 3 months or sooner prn

## 2021-01-20 ENCOUNTER — TRANSCRIPTION ENCOUNTER (OUTPATIENT)
Age: 76
End: 2021-01-20

## 2021-04-13 ENCOUNTER — APPOINTMENT (OUTPATIENT)
Dept: FAMILY MEDICINE | Facility: CLINIC | Age: 76
End: 2021-04-13
Payer: MEDICARE

## 2021-04-13 PROCEDURE — 99212 OFFICE O/P EST SF 10 MIN: CPT | Mod: 95

## 2021-04-13 RX ORDER — CIPROFLOXACIN AND DEXAMETHASONE 3; 1 MG/ML; MG/ML
0.3-0.1 SUSPENSION/ DROPS AURICULAR (OTIC)
Qty: 7 | Refills: 0 | Status: DISCONTINUED | COMMUNITY
Start: 2020-11-27

## 2021-04-13 NOTE — HISTORY OF PRESENT ILLNESS
[9] : 2. What number best describes how, during the past week, pain has interfered with your enjoyment of life? 9/10 pain [8] : 3. What number best describes how, during the past week, pain has interfered with your general activity? 8/10 pain [< = 3 Low Risk] : Opioid Risk Tool: < = 3 Low Risk [Home] : at home, [unfilled] , at the time of the visit. [Medical Office: ([facility])___] : at the medical office located in  [Verbal consent obtained from patient] : the patient, [unfilled] [Persistent pain despite utilization of non-opioid alternative(s)] : Persistent pain despite utilization of non-opioid alternative(s) [Opioids for pain that has lasted more than 3 months, or past time of normal tissue healing (> 3 months)] : Opioids for pain that has lasted more than 3 months, or past time of normal tissue healing (> 3 months) [I-Stop completed at today's visit] : I-Stop completed at today's visit [Last controlled substance contract reviewed and signed with patient:] : Last controlled substance contract reviewed and signed with patient [] : Sedation: None [A discussion was had and/or printed educational materials were provided to the patient inclusive of safe treatment and optimization.] : A discussion was had and/or printed educational materials were provided to the patient inclusive of safe treatment and optimization. The treatment may include non-pharmacologic modalities or a combination of approaches.  We discussed risks, benefits, and alternatives associated with treatment [FreeTextEntry2] : 25 [de-identified] : 4/13/2021 [FreeTextEntry4] : 10/12/2020 [FreeTextEntry6] : she will continue low dose of hydrocodone daily, she already tried injections with pain management without improvement [FreeTextEntry1] : Pt presents for a follow up on her back pain [de-identified] : After receiving verbal consent the visit was performed virtually via [employer] [facility] as the patient was unable to be seen in the office due to the COVID 19 pandemic.  She received both doses of the covid vaccine.  She isn't ready to schedule the mammogram yet.  Her back pain is unchanged, she takes 1-2 hydrocodone daily and does get pain relief.\par

## 2021-04-13 NOTE — REVIEW OF SYSTEMS
[Joint Pain] : joint pain [Joint Stiffness] : joint stiffness [Back Pain] : back pain [Negative] : Heme/Lymph [Muscle Pain] : no muscle pain

## 2021-04-13 NOTE — PHYSICAL EXAM
[No Acute Distress] : no acute distress [Well Nourished] : well nourished [Well Developed] : well developed [Well-Appearing] : well-appearing [Normal Voice/Communication] : normal voice/communication [No Respiratory Distress] : no respiratory distress  [Normal Mood] : the mood was normal

## 2021-04-13 NOTE — PLAN
[FreeTextEntry1] : she will be mailed a rx for the comprehensive urine drug screen and will go for the lab testing as previously ordered, I-STOP was checked and hydrocodone renewed, she will continue to take it only for severe pain and will follow up in 3 months or sooner prn

## 2021-07-13 ENCOUNTER — APPOINTMENT (OUTPATIENT)
Dept: FAMILY MEDICINE | Facility: CLINIC | Age: 76
End: 2021-07-13
Payer: MEDICARE

## 2021-07-13 VITALS
DIASTOLIC BLOOD PRESSURE: 80 MMHG | TEMPERATURE: 97.8 F | OXYGEN SATURATION: 98 % | RESPIRATION RATE: 14 BRPM | HEIGHT: 64 IN | BODY MASS INDEX: 21.85 KG/M2 | WEIGHT: 128 LBS | SYSTOLIC BLOOD PRESSURE: 126 MMHG | HEART RATE: 72 BPM

## 2021-07-13 LAB
AMPHETAMINE: NORMAL
BARBITUATES: NORMAL
BENZODIAZ UR-MCNC: NORMAL
BUPRENORPHINE: NORMAL
CANNABINOIDS SAL QL SCN: NORMAL
COCAINE METAB.OTHER UR-MCNC: NORMAL
ECSTASY (MDMA): NORMAL
METHADONE: NORMAL
METHAMPHETAMINE: NORMAL
OPIATES 300 (OPI 300/MOP): NORMAL
OPIATES: NORMAL
OXYCODONE UR-MCNC: NORMAL
PCP UR-MCNC: NORMAL
PROPOXYPHENE: NORMAL
THC UR QL: NORMAL
TRICYCLICS FLD-MCNC: NORMAL

## 2021-07-13 PROCEDURE — 99214 OFFICE O/P EST MOD 30 MIN: CPT

## 2021-07-13 PROCEDURE — 80305 DRUG TEST PRSMV DIR OPT OBS: CPT | Mod: QW

## 2021-07-13 NOTE — PLAN
[FreeTextEntry1] : the lab report including CBC, CMP, lipids was reviewed, she will continue healthy diet and medications, POCT urine drug screen was ordered and will be reviewed, I-STOP was checked and hydrocodone renewed, she isn't ready to schedule the mammogram yet

## 2021-07-13 NOTE — HISTORY OF PRESENT ILLNESS
[Persistent pain despite utilization of non-opioid alternative(s)] : Persistent pain despite utilization of non-opioid alternative(s) [Opioids for pain that has lasted more than 3 months, or past time of normal tissue healing (> 3 months)] : Opioids for pain that has lasted more than 3 months, or past time of normal tissue healing (> 3 months) [3] : 1. What number best describes your pain on average in the past week? 3/10 pain [8] : 3. What number best describes how, during the past week, pain has interfered with your general activity? 8/10 pain [] : Sedation: None [< = 3 Low Risk] : Opioid Risk Tool: < = 3 Low Risk [<50 MME/day (low risk)] : <50 MME/day (low risk) [I-Stop completed at today's visit] : I-Stop completed at today's visit [Last controlled substance contract reviewed and signed with patient:] : Last controlled substance contract reviewed and signed with patient [A discussion was had and/or printed educational materials were provided to the patient inclusive of safe treatment and optimization.] : A discussion was had and/or printed educational materials were provided to the patient inclusive of safe treatment and optimization. The treatment may include non-pharmacologic modalities or a combination of approaches.  We discussed risks, benefits, and alternatives associated with treatment [FreeTextEntry1] : Patient presents for medication renewal.  She would like to renew the hydrocodone that she takes for her back pain with relief of symptoms.  She would like a rx for a new back brace.\par  [FreeTextEntry2] : 19 [FreeTextEntry3] : 10 [de-identified] : 7/13/2021 [de-identified] : 7/13/2021 [FreeTextEntry4] : 10/12/2020 [FreeTextEntry6] : she will continue low dose of hydrocodone, she doesn't want to pursue pain management and she was given a rx for a new back brace [de-identified] : She also presents to review the recent lab report

## 2021-07-13 NOTE — PHYSICAL EXAM
[No Acute Distress] : no acute distress [Well Nourished] : well nourished [Well Developed] : well developed [Well-Appearing] : well-appearing [Normal Voice/Communication] : normal voice/communication [Supple] : supple [No Respiratory Distress] : no respiratory distress  [No Accessory Muscle Use] : no accessory muscle use [Clear to Auscultation] : lungs were clear to auscultation bilaterally [Normal Rate] : normal rate  [Regular Rhythm] : with a regular rhythm [Normal S1, S2] : normal S1 and S2 [Coordination Grossly Intact] : coordination grossly intact [Normal Mood] : the mood was normal [de-identified] : decreased ROM of lumbar spine

## 2021-07-18 LAB — COMPREHENSIVE SCREEN URINE: NORMAL

## 2021-10-04 ENCOUNTER — APPOINTMENT (OUTPATIENT)
Dept: OPHTHALMOLOGY | Facility: CLINIC | Age: 76
End: 2021-10-04

## 2021-10-07 ENCOUNTER — APPOINTMENT (OUTPATIENT)
Dept: OPHTHALMOLOGY | Facility: CLINIC | Age: 76
End: 2021-10-07

## 2021-10-13 ENCOUNTER — APPOINTMENT (OUTPATIENT)
Dept: FAMILY MEDICINE | Facility: CLINIC | Age: 76
End: 2021-10-13
Payer: MEDICARE

## 2021-10-13 VITALS
HEART RATE: 60 BPM | OXYGEN SATURATION: 97 % | DIASTOLIC BLOOD PRESSURE: 78 MMHG | WEIGHT: 129 LBS | HEIGHT: 64 IN | TEMPERATURE: 98 F | SYSTOLIC BLOOD PRESSURE: 120 MMHG | BODY MASS INDEX: 22.02 KG/M2 | RESPIRATION RATE: 14 BRPM

## 2021-10-13 DIAGNOSIS — Z00.00 ENCOUNTER FOR GENERAL ADULT MEDICAL EXAMINATION W/OUT ABNORMAL FINDINGS: ICD-10-CM

## 2021-10-13 PROCEDURE — G0439: CPT

## 2021-10-13 PROCEDURE — G0008: CPT

## 2021-10-13 PROCEDURE — 90662 IIV NO PRSV INCREASED AG IM: CPT

## 2021-10-13 NOTE — PLAN
[FreeTextEntry1] : I-STOP was checked and hydrocodone renewed, recent labs were reviewed, high dose flu vaccine given and she may follow up in 6 months or sooner prn, a new controlled substances agreement form completed today

## 2021-10-13 NOTE — REASON FOR VISIT
[Annual Wellness Visit] : an annual wellness visit [FreeTextEntry1] : Patient presents for Medicare Wellness Exam, flu shot and medication renewal

## 2021-10-13 NOTE — HEALTH RISK ASSESSMENT
[Very Good] : ~his/her~  mood as very good [0-4] : 0-4 [No] : In the past 12 months have you used drugs other than those required for medical reasons? No [No falls in past year] : Patient reported no falls in the past year [Patient declined mammogram] : Patient declined mammogram [Patient declined PAP Smear] : Patient declined PAP Smear [HIV test declined] : HIV test declined [Hepatitis C test declined] : Hepatitis C test declined [Single] : single [Feels Safe at Home] : Feels safe at home [Fully functional (bathing, dressing, toileting, transferring, walking, feeding)] : Fully functional (bathing, dressing, toileting, transferring, walking, feeding) [Fully functional (using the telephone, shopping, preparing meals, housekeeping, doing laundry, using] : Fully functional and needs no help or supervision to perform IADLs (using the telephone, shopping, preparing meals, housekeeping, doing laundry, using transportation, managing medications and managing finances) [Reports normal functional visual acuity (ie: able to read med bottle)] : Reports normal functional visual acuity [Reports changes in dental health] : Reports changes in dental health [Smoke Detector] : smoke detector [Carbon Monoxide Detector] : carbon monoxide detector [Safety elements used in home] : safety elements used in home [Seat Belt] :  uses seat belt [Sunscreen] : uses sunscreen [With Patient/Caregiver] : , with patient/caregiver [Designated Healthcare Proxy] : Designated healthcare proxy [DNR] : DNR [DNI] : DNI [FreeTextEntry1] : none [] : No [de-identified] : none [de-identified] : Dr Muller (Cardio) [de-identified] : walks for exercise [de-identified] : healthy [Change in mental status noted] : No change in mental status noted [Language] : denies difficulty with language [Behavior] : denies difficulty with behavior [Learning/Retaining New Information] : denies difficulty learning/retaining new information [Handling Complex Tasks] : denies difficulty handling complex tasks [Reasoning] : denies difficulty with reasoning [Spatial Ability and Orientation] : denies difficulty with spatial ability and orientation [Reports changes in hearing] : Reports no changes in hearing [Reports changes in vision] : Reports no changes in vision [Guns at Home] : no guns at home [Travel to Developing Areas] : does not  travel to developing areas [TB Exposure] : is not being exposed to tuberculosis [Caregiver Concerns] : does not have caregiver concerns [ColonoscopyDate] : 11/13 [de-identified] : has a roommate [de-identified] : had a tooth pulled [AdvancecareDate] : 10/21

## 2022-04-12 ENCOUNTER — APPOINTMENT (OUTPATIENT)
Dept: FAMILY MEDICINE | Facility: CLINIC | Age: 77
End: 2022-04-12
Payer: MEDICARE

## 2022-04-12 VITALS
RESPIRATION RATE: 14 BRPM | HEART RATE: 61 BPM | BODY MASS INDEX: 22.02 KG/M2 | WEIGHT: 129 LBS | SYSTOLIC BLOOD PRESSURE: 136 MMHG | DIASTOLIC BLOOD PRESSURE: 70 MMHG | OXYGEN SATURATION: 95 % | HEIGHT: 64 IN

## 2022-04-12 VITALS — TEMPERATURE: 98.8 F

## 2022-04-12 DIAGNOSIS — Z79.899 OTHER LONG TERM (CURRENT) DRUG THERAPY: ICD-10-CM

## 2022-04-12 DIAGNOSIS — J30.1 ALLERGIC RHINITIS DUE TO POLLEN: ICD-10-CM

## 2022-04-12 DIAGNOSIS — R69 ILLNESS, UNSPECIFIED: ICD-10-CM

## 2022-04-12 PROCEDURE — 99214 OFFICE O/P EST MOD 30 MIN: CPT

## 2022-04-12 NOTE — PHYSICAL EXAM
[No Acute Distress] : no acute distress [Well Nourished] : well nourished [Well Developed] : well developed [Well-Appearing] : well-appearing [Normal Voice/Communication] : normal voice/communication [No Respiratory Distress] : no respiratory distress  [Coordination Grossly Intact] : coordination grossly intact [Normal Mood] : the mood was normal [de-identified] : decreased ROM of lumbar spine

## 2022-04-12 NOTE — HISTORY OF PRESENT ILLNESS
[Persistent pain despite utilization of non-opioid alternative(s)] : Persistent pain despite utilization of non-opioid alternative(s) [Opioids for pain that has lasted more than 3 months, or past time of normal tissue healing (> 3 months)] : Opioids for pain that has lasted more than 3 months, or past time of normal tissue healing (> 3 months) [8] : 3. What number best describes how, during the past week, pain has interfered with your general activity? 8/10 pain [< = 3 Low Risk] : Opioid Risk Tool: < = 3 Low Risk [] : Sedation: None [<50 MME/day (low risk)] : <50 MME/day (low risk) [I-Stop completed at today's visit] : I-Stop completed at today's visit [Last controlled substance contract reviewed and signed with patient:] : Last controlled substance contract reviewed and signed with patient [FreeTextEntry1] : patient presents for medication renewal\par  [FreeTextEntry2] : 24 [FreeTextEntry3] : 10 [de-identified] : 4/12/22 [de-identified] : 7/13/21 [FreeTextEntry4] : 10/13/21 [FreeTextEntry6] : she will continue current tx, she declines a PT rx [de-identified] : She would like to renew the hydrocodone, she does get relief of her ongoing back pain when taking it.  She would like to renew the fluticasone nasal spray due to the spring pollen.

## 2022-04-12 NOTE — REVIEW OF SYSTEMS
[Nasal Discharge] : nasal discharge [Joint Pain] : joint pain [Joint Stiffness] : joint stiffness [Back Pain] : back pain [Negative] : Heme/Lymph [Constipation] : no constipation

## 2022-04-12 NOTE — PLAN
[FreeTextEntry1] : I-STOP was checked and hydrocodone renewed, she will continue to take 1 daily for her chronic pain, labs were ordered to further assess her health, fluticasone was renewed, her hypertension is stable, she will follow up in 6 months for a well visit or sooner prn

## 2022-05-05 ENCOUNTER — RX CHANGE (OUTPATIENT)
Age: 77
End: 2022-05-05

## 2022-05-05 RX ORDER — FLUTICASONE PROPIONATE 50 UG/1
50 SPRAY, METERED NASAL
Qty: 16 | Refills: 2 | Status: DISCONTINUED | COMMUNITY
Start: 2020-02-04 | End: 2022-05-05

## 2022-06-08 ENCOUNTER — NON-APPOINTMENT (OUTPATIENT)
Age: 77
End: 2022-06-08

## 2022-06-13 ENCOUNTER — APPOINTMENT (OUTPATIENT)
Dept: OPHTHALMOLOGY | Facility: CLINIC | Age: 77
End: 2022-06-13
Payer: MEDICARE

## 2022-06-13 ENCOUNTER — NON-APPOINTMENT (OUTPATIENT)
Age: 77
End: 2022-06-13

## 2022-06-13 PROCEDURE — 92134 CPTRZ OPH DX IMG PST SGM RTA: CPT

## 2022-06-13 PROCEDURE — 92014 COMPRE OPH EXAM EST PT 1/>: CPT

## 2022-06-20 DIAGNOSIS — R23.8 OTHER SKIN CHANGES: ICD-10-CM

## 2022-07-31 ENCOUNTER — RX RENEWAL (OUTPATIENT)
Age: 77
End: 2022-07-31

## 2022-07-31 RX ORDER — FLUTICASONE PROPIONATE 50 UG/1
50 SPRAY, METERED NASAL
Qty: 48 | Refills: 3 | Status: ACTIVE | COMMUNITY
Start: 2022-05-05 | End: 1900-01-01

## 2022-09-07 ENCOUNTER — APPOINTMENT (OUTPATIENT)
Dept: FAMILY MEDICINE | Facility: CLINIC | Age: 77
End: 2022-09-07

## 2022-09-07 PROCEDURE — 99441: CPT | Mod: 95

## 2022-09-07 NOTE — PLAN
[FreeTextEntry1] : she was advised to stop taking Aleve and to take the steroid pack as directed and continue hydrocodone and application of ice and heat and to call in 5 days if unimproved or sooner if symptoms worsen

## 2022-09-07 NOTE — HISTORY OF PRESENT ILLNESS
[Home] : at home, [unfilled] , at the time of the visit. [Medical Office: ([facility])___] : at the medical office located in  [Verbal consent obtained from patient] : the patient, [unfilled] [FreeTextEntry8] : The patient telephoned and requested a call back to discuss their health.  The visit was performed over the telephone as the patient was unable to be seen in the office due to the COVID 19 pandemic. On 9/3 she was squatting repotting a plant and was also pulling and pushing plants and when she stood up she felt a severe pain in her lower back.  The pain is persisting but not radiating into the legs.  She has been taking Aleve and hydrocodone, applied ice and heat and Salon Pas patches without significant improvement.\par

## 2022-09-09 ENCOUNTER — EMERGENCY (EMERGENCY)
Facility: HOSPITAL | Age: 77
LOS: 1 days | Discharge: HOME | End: 2022-09-09
Admitting: EMERGENCY MEDICINE

## 2022-09-09 DIAGNOSIS — M51.36 OTHER INTERVERTEBRAL DISC DEGENERATION, LUMBAR REGION: ICD-10-CM

## 2022-09-09 DIAGNOSIS — M54.50 LOW BACK PAIN, UNSPECIFIED: ICD-10-CM
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
## 2022-09-09 DIAGNOSIS — S32.028A OTHER FRACTURE OF SECOND LUMBAR VERTEBRA, INITIAL ENCOUNTER FOR CLOSED FRACTURE: ICD-10-CM
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
## 2022-09-09 DIAGNOSIS — I10 ESSENTIAL (PRIMARY) HYPERTENSION: ICD-10-CM
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
## 2022-09-09 PROCEDURE — 99285 EMERGENCY DEPT VISIT HI MDM: CPT | Mod: FS

## 2022-09-09 PROCEDURE — 72131 CT LUMBAR SPINE W/O DYE: CPT | Mod: 26,MA

## 2022-09-10 RX ORDER — METHYLPREDNISOLONE 4 MG/1
4 TABLET ORAL
Qty: 1 | Refills: 0 | Status: DISCONTINUED | COMMUNITY
Start: 2022-09-07 | End: 2022-09-10

## 2022-09-12 ENCOUNTER — APPOINTMENT (OUTPATIENT)
Dept: OPHTHALMOLOGY | Facility: CLINIC | Age: 77
End: 2022-09-12

## 2022-09-16 DIAGNOSIS — Z13.820 ENCOUNTER FOR SCREENING FOR OSTEOPOROSIS: ICD-10-CM

## 2022-09-19 ENCOUNTER — APPOINTMENT (OUTPATIENT)
Dept: FAMILY MEDICINE | Facility: CLINIC | Age: 77
End: 2022-09-19

## 2022-09-26 ENCOUNTER — APPOINTMENT (OUTPATIENT)
Dept: OPHTHALMOLOGY | Facility: HOSPITAL | Age: 77
End: 2022-09-26

## 2022-09-27 ENCOUNTER — APPOINTMENT (OUTPATIENT)
Dept: OPHTHALMOLOGY | Facility: CLINIC | Age: 77
End: 2022-09-27

## 2022-10-03 ENCOUNTER — APPOINTMENT (OUTPATIENT)
Dept: OPHTHALMOLOGY | Facility: CLINIC | Age: 77
End: 2022-10-03

## 2022-10-07 ENCOUNTER — APPOINTMENT (OUTPATIENT)
Dept: RADIOLOGY | Facility: CLINIC | Age: 77
End: 2022-10-07

## 2022-10-07 PROCEDURE — 77080 DXA BONE DENSITY AXIAL: CPT

## 2022-10-12 ENCOUNTER — APPOINTMENT (OUTPATIENT)
Dept: FAMILY MEDICINE | Facility: CLINIC | Age: 77
End: 2022-10-12

## 2022-10-12 VITALS
BODY MASS INDEX: 21.85 KG/M2 | OXYGEN SATURATION: 98 % | WEIGHT: 128 LBS | HEIGHT: 64 IN | SYSTOLIC BLOOD PRESSURE: 120 MMHG | RESPIRATION RATE: 14 BRPM | HEART RATE: 71 BPM | DIASTOLIC BLOOD PRESSURE: 70 MMHG

## 2022-10-12 VITALS — TEMPERATURE: 97 F

## 2022-10-12 DIAGNOSIS — M85.88 OTHER SPECIFIED DISORDERS OF BONE DENSITY AND STRUCTURE, OTHER SITE: ICD-10-CM

## 2022-10-12 DIAGNOSIS — M85.852 OTHER SPECIFIED DISORDERS OF BONE DENSITY AND STRUCTURE, RIGHT THIGH: ICD-10-CM

## 2022-10-12 DIAGNOSIS — M85.851 OTHER SPECIFIED DISORDERS OF BONE DENSITY AND STRUCTURE, RIGHT THIGH: ICD-10-CM

## 2022-10-12 PROCEDURE — 99214 OFFICE O/P EST MOD 30 MIN: CPT | Mod: 25

## 2022-10-12 PROCEDURE — 90662 IIV NO PRSV INCREASED AG IM: CPT

## 2022-10-12 PROCEDURE — G0008: CPT

## 2022-10-12 RX ORDER — COLD-HOT PACK
125 MCG EACH MISCELLANEOUS
Refills: 0 | Status: ACTIVE | COMMUNITY

## 2022-10-12 RX ORDER — UBIDECARENONE/VIT E ACET 100MG-5
1000 CAPSULE ORAL
Refills: 0 | Status: DISCONTINUED | COMMUNITY
End: 2022-10-12

## 2022-10-12 NOTE — PLAN
[FreeTextEntry1] : the cardiology note, dexa report, spine ortho note and lab report were reviewed, her conditions are stable and no medication changes were made, I-sTOP was checked and hydrocodone renewed, high dose flu vaccine given and follow up for well visit advised

## 2022-10-12 NOTE — HISTORY OF PRESENT ILLNESS
[FreeTextEntry1] : patient presents for medication renewal and flu shot  [de-identified] : She saw Dr Muller and the rosuvastatin dose was changed to twice a week as her LDL was a little high.  She is still having pain in her lower back from the fracture but it isn't as bad as it was.  She will follow up with the spine orthopedist.  She doesn't want to take medication for the osteopenia and just started a new bone support supplement.  She would like to renew hydrocodone today.  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

## 2022-11-05 ENCOUNTER — RX RENEWAL (OUTPATIENT)
Age: 77
End: 2022-11-05

## 2023-01-03 ENCOUNTER — RX RENEWAL (OUTPATIENT)
Age: 78
End: 2023-01-03

## 2023-03-06 ENCOUNTER — RX RENEWAL (OUTPATIENT)
Age: 78
End: 2023-03-06

## 2023-04-11 ENCOUNTER — APPOINTMENT (OUTPATIENT)
Dept: FAMILY MEDICINE | Facility: CLINIC | Age: 78
End: 2023-04-11
Payer: MEDICARE

## 2023-04-11 VITALS
WEIGHT: 129 LBS | SYSTOLIC BLOOD PRESSURE: 130 MMHG | DIASTOLIC BLOOD PRESSURE: 86 MMHG | RESPIRATION RATE: 14 BRPM | HEART RATE: 73 BPM | TEMPERATURE: 99.1 F | BODY MASS INDEX: 22.02 KG/M2 | HEIGHT: 64 IN | OXYGEN SATURATION: 96 %

## 2023-04-11 DIAGNOSIS — S33.5XXA SPRAIN OF LIGAMENTS OF LUMBAR SPINE, INITIAL ENCOUNTER: ICD-10-CM

## 2023-04-11 DIAGNOSIS — I10 ESSENTIAL (PRIMARY) HYPERTENSION: ICD-10-CM

## 2023-04-11 DIAGNOSIS — S32.020A WEDGE COMPRESSION FRACTURE OF SECOND LUMBAR VERTEBRA, INITIAL ENCOUNTER FOR CLOSED FRACTURE: ICD-10-CM

## 2023-04-11 PROCEDURE — 99214 OFFICE O/P EST MOD 30 MIN: CPT

## 2023-04-11 RX ORDER — ROSUVASTATIN CALCIUM 5 MG/1
5 TABLET, FILM COATED ORAL
Refills: 0 | Status: ACTIVE | COMMUNITY

## 2023-04-11 RX ORDER — MELOXICAM 15 MG/1
15 TABLET ORAL
Qty: 30 | Refills: 1 | Status: DISCONTINUED | COMMUNITY
Start: 2022-09-10 | End: 2023-04-11

## 2023-04-11 RX ORDER — MELOXICAM 15 MG/1
15 TABLET ORAL
Refills: 0 | Status: ACTIVE | COMMUNITY

## 2023-04-11 NOTE — HISTORY OF PRESENT ILLNESS
[FreeTextEntry1] : patient presents for medication renewal\par  [de-identified] : She is still having the persistent pain in her back, she followed up with the spine ortho in November and was advised that the fracture healed but it caused a curve in her spine.  She was in PT until January.  She would like to renew hydrocodone today, she does get temporary relief from it. She has been seeing Dr Muller for her cholesterol and BP and the rosuvastatin dose was increased to 3 times a week.  She will go for lab testing in October ordered by him.  She will also had an Echo done in October.

## 2023-04-11 NOTE — PLAN
[FreeTextEntry1] : the last 2 cardiology notes were reviewed, a controlled substances agreement form was completed.  The pain due to the lumbar disc and fracture are stable and I-STOP was checked and I renewed hydrocodone 5/325 to be taken bid prn, she also takes meloxicam 15 mg daily as needed.  She will continue the cardiac medications as per cardiology.  She wants to defer Prevnar 20 for today.  She was advised to follow up in 6 months for a well visit and may call in 3 months for her rx renewal\par

## 2023-04-11 NOTE — PHYSICAL EXAM
[No Acute Distress] : no acute distress [Well Nourished] : well nourished [Well Developed] : well developed [Well-Appearing] : well-appearing [Normal Voice/Communication] : normal voice/communication [No Respiratory Distress] : no respiratory distress  [No Spinal Tenderness] : no spinal tenderness [Normal Mood] : the mood was normal

## 2023-08-31 ENCOUNTER — APPOINTMENT (OUTPATIENT)
Dept: OPHTHALMOLOGY | Facility: CLINIC | Age: 78
End: 2023-08-31
Payer: MEDICARE

## 2023-08-31 ENCOUNTER — APPOINTMENT (OUTPATIENT)
Dept: OPHTHALMOLOGY | Facility: CLINIC | Age: 78
End: 2023-08-31
Payer: SELF-PAY

## 2023-08-31 ENCOUNTER — NON-APPOINTMENT (OUTPATIENT)
Age: 78
End: 2023-08-31

## 2023-08-31 PROCEDURE — 92015 DETERMINE REFRACTIVE STATE: CPT

## 2023-08-31 PROCEDURE — 92134 CPTRZ OPH DX IMG PST SGM RTA: CPT

## 2023-08-31 PROCEDURE — 92014 COMPRE OPH EXAM EST PT 1/>: CPT

## 2023-10-13 ENCOUNTER — APPOINTMENT (OUTPATIENT)
Dept: FAMILY MEDICINE | Facility: CLINIC | Age: 78
End: 2023-10-13
Payer: MEDICARE

## 2023-10-13 VITALS
RESPIRATION RATE: 14 BRPM | OXYGEN SATURATION: 96 % | DIASTOLIC BLOOD PRESSURE: 70 MMHG | HEART RATE: 66 BPM | HEIGHT: 64 IN | SYSTOLIC BLOOD PRESSURE: 120 MMHG | WEIGHT: 128 LBS | TEMPERATURE: 98.8 F | BODY MASS INDEX: 21.85 KG/M2

## 2023-10-13 DIAGNOSIS — M51.9 UNSPECIFIED THORACIC, THORACOLUMBAR AND LUMBOSACRAL INTERVERTEBRAL DISC DISORDER: ICD-10-CM

## 2023-10-13 DIAGNOSIS — Z23 ENCOUNTER FOR IMMUNIZATION: ICD-10-CM

## 2023-10-13 DIAGNOSIS — E78.5 HYPERLIPIDEMIA, UNSPECIFIED: ICD-10-CM

## 2023-10-13 PROCEDURE — 99214 OFFICE O/P EST MOD 30 MIN: CPT | Mod: 25

## 2023-10-13 PROCEDURE — 90662 IIV NO PRSV INCREASED AG IM: CPT

## 2023-10-13 PROCEDURE — G0008: CPT

## 2024-01-09 ENCOUNTER — NON-APPOINTMENT (OUTPATIENT)
Age: 79
End: 2024-01-09

## 2024-03-17 ENCOUNTER — NON-APPOINTMENT (OUTPATIENT)
Age: 79
End: 2024-03-17

## 2024-03-21 ENCOUNTER — APPOINTMENT (OUTPATIENT)
Dept: OPHTHALMOLOGY | Facility: CLINIC | Age: 79
End: 2024-03-21
Payer: MEDICARE

## 2024-03-21 ENCOUNTER — NON-APPOINTMENT (OUTPATIENT)
Age: 79
End: 2024-03-21

## 2024-03-21 PROCEDURE — 92014 COMPRE OPH EXAM EST PT 1/>: CPT

## 2024-03-21 PROCEDURE — 92250 FUNDUS PHOTOGRAPHY W/I&R: CPT

## 2024-03-21 RX ORDER — HYDROCODONE BITARTRATE AND ACETAMINOPHEN 5; 325 MG/1; MG/1
5-325 TABLET ORAL TWICE DAILY
Qty: 180 | Refills: 0 | Status: ACTIVE | COMMUNITY
Start: 2018-11-30 | End: 1900-01-01

## 2024-05-01 ENCOUNTER — APPOINTMENT (OUTPATIENT)
Dept: FAMILY MEDICINE | Facility: CLINIC | Age: 79
End: 2024-05-01
Payer: MEDICARE

## 2024-05-01 VITALS
DIASTOLIC BLOOD PRESSURE: 70 MMHG | BODY MASS INDEX: 22.02 KG/M2 | HEIGHT: 64 IN | SYSTOLIC BLOOD PRESSURE: 132 MMHG | OXYGEN SATURATION: 96 % | WEIGHT: 129 LBS | HEART RATE: 65 BPM

## 2024-05-01 DIAGNOSIS — E61.1 IRON DEFICIENCY: ICD-10-CM

## 2024-05-01 DIAGNOSIS — M47.9 SPONDYLOSIS, UNSPECIFIED: ICD-10-CM

## 2024-05-01 DIAGNOSIS — E55.9 VITAMIN D DEFICIENCY, UNSPECIFIED: ICD-10-CM

## 2024-05-01 PROCEDURE — G2211 COMPLEX E/M VISIT ADD ON: CPT

## 2024-05-01 PROCEDURE — 99213 OFFICE O/P EST LOW 20 MIN: CPT

## 2024-05-01 RX ORDER — COLLAGEN, HYDROLYSATE (BOVINE) 100 %
POWDER (GRAM) MISCELLANEOUS
Refills: 0 | Status: ACTIVE | COMMUNITY

## 2024-05-01 NOTE — HISTORY OF PRESENT ILLNESS
[FreeTextEntry1] : Patient presents for a follow up and wants blood work script. [de-identified] : She would like to have her Vitamin D and iron checked.  She has been getting adequate pain relief from the hydrocodone/acetaminophen.  She followed up with Dr Humphries and was advised that the cataracts moved and she doesn't require surgery at this time.

## 2024-05-01 NOTE — PLAN
[FreeTextEntry1] : She was advised to continue taking hydrocodone/acetaminophen 5/325 bid prn pain.  I ordered labs to check on her Vitamin D and iron levels and to rule out anemia.  She will follow up in October for her well visit or sooner prn.

## 2024-05-01 NOTE — PHYSICAL EXAM
[No Acute Distress] : no acute distress [Well Nourished] : well nourished [Well Developed] : well developed [Well-Appearing] : well-appearing [Normal Voice/Communication] : normal voice/communication [No Respiratory Distress] : no respiratory distress  [No Accessory Muscle Use] : no accessory muscle use [Clear to Auscultation] : lungs were clear to auscultation bilaterally [Normal Rate] : normal rate  [Regular Rhythm] : with a regular rhythm [Normal S1, S2] : normal S1 and S2 [Normal Mood] : the mood was normal [de-identified] : decreased ROM of lumbar spine

## 2024-09-26 ENCOUNTER — APPOINTMENT (OUTPATIENT)
Dept: OPHTHALMOLOGY | Facility: CLINIC | Age: 79
End: 2024-09-26

## 2024-10-22 ENCOUNTER — APPOINTMENT (OUTPATIENT)
Dept: FAMILY MEDICINE | Facility: CLINIC | Age: 79
End: 2024-10-22
Payer: MEDICARE

## 2024-10-22 VITALS
SYSTOLIC BLOOD PRESSURE: 120 MMHG | HEART RATE: 60 BPM | TEMPERATURE: 98.6 F | WEIGHT: 133 LBS | HEIGHT: 64 IN | BODY MASS INDEX: 22.71 KG/M2 | OXYGEN SATURATION: 97 % | DIASTOLIC BLOOD PRESSURE: 66 MMHG | RESPIRATION RATE: 14 BRPM

## 2024-10-22 DIAGNOSIS — Z23 ENCOUNTER FOR IMMUNIZATION: ICD-10-CM

## 2024-10-22 DIAGNOSIS — M51.9 UNSPECIFIED THORACIC, THORACOLUMBAR AND LUMBOSACRAL INTERVERTEBRAL DISC DISORDER: ICD-10-CM

## 2024-10-22 PROCEDURE — 99213 OFFICE O/P EST LOW 20 MIN: CPT

## 2024-10-22 PROCEDURE — G0008: CPT

## 2024-10-22 PROCEDURE — G2211 COMPLEX E/M VISIT ADD ON: CPT

## 2024-10-22 PROCEDURE — 90662 IIV NO PRSV INCREASED AG IM: CPT

## 2024-10-22 RX ORDER — MULTIVITAMIN
TABLET ORAL
Refills: 0 | Status: ACTIVE | COMMUNITY

## 2025-04-21 ENCOUNTER — APPOINTMENT (OUTPATIENT)
Dept: FAMILY MEDICINE | Facility: CLINIC | Age: 80
End: 2025-04-21
Payer: MEDICARE

## 2025-04-21 VITALS
OXYGEN SATURATION: 96 % | WEIGHT: 134 LBS | HEIGHT: 64 IN | BODY MASS INDEX: 22.88 KG/M2 | HEART RATE: 63 BPM | SYSTOLIC BLOOD PRESSURE: 130 MMHG | DIASTOLIC BLOOD PRESSURE: 70 MMHG | TEMPERATURE: 98.9 F | RESPIRATION RATE: 12 BRPM

## 2025-04-21 DIAGNOSIS — M51.9 UNSPECIFIED THORACIC, THORACOLUMBAR AND LUMBOSACRAL INTERVERTEBRAL DISC DISORDER: ICD-10-CM

## 2025-04-21 PROCEDURE — 99213 OFFICE O/P EST LOW 20 MIN: CPT

## 2025-04-21 PROCEDURE — G2211 COMPLEX E/M VISIT ADD ON: CPT

## 2025-07-23 ENCOUNTER — NON-APPOINTMENT (OUTPATIENT)
Age: 80
End: 2025-07-23